# Patient Record
Sex: MALE | Race: WHITE
[De-identification: names, ages, dates, MRNs, and addresses within clinical notes are randomized per-mention and may not be internally consistent; named-entity substitution may affect disease eponyms.]

---

## 2017-09-24 ENCOUNTER — HOSPITAL ENCOUNTER (OUTPATIENT)
Dept: HOSPITAL 47 - EC | Age: 82
Setting detail: OBSERVATION
LOS: 1 days | Discharge: HOME | End: 2017-09-25
Payer: MEDICARE

## 2017-09-24 VITALS — BODY MASS INDEX: 26.1 KG/M2

## 2017-09-24 DIAGNOSIS — R55: Primary | ICD-10-CM

## 2017-09-24 DIAGNOSIS — E87.2: ICD-10-CM

## 2017-09-24 DIAGNOSIS — Z87.891: ICD-10-CM

## 2017-09-24 DIAGNOSIS — T67.5XXA: ICD-10-CM

## 2017-09-24 DIAGNOSIS — E78.5: ICD-10-CM

## 2017-09-24 DIAGNOSIS — M21.371: ICD-10-CM

## 2017-09-24 DIAGNOSIS — E86.0: ICD-10-CM

## 2017-09-24 DIAGNOSIS — I95.9: ICD-10-CM

## 2017-09-24 DIAGNOSIS — G56.03: ICD-10-CM

## 2017-09-24 DIAGNOSIS — Z79.899: ICD-10-CM

## 2017-09-24 LAB
ALP SERPL-CCNC: 64 U/L (ref 38–126)
ALT SERPL-CCNC: 39 U/L (ref 21–72)
ANION GAP SERPL CALC-SCNC: 11 MMOL/L
APTT BLD: 23.2 SEC (ref 22–30)
AST SERPL-CCNC: 23 U/L (ref 17–59)
BASOPHILS # BLD AUTO: 0.1 K/UL (ref 0–0.2)
BASOPHILS NFR BLD AUTO: 1 %
BUN SERPL-SCNC: 16 MG/DL (ref 9–20)
CALCIUM SPEC-MCNC: 8.8 MG/DL (ref 8.4–10.2)
CH: 33.1
CHCM: 33.9
CHLORIDE SERPL-SCNC: 107 MMOL/L (ref 98–107)
CK SERPL-CCNC: 49 U/L (ref 55–170)
CO2 SERPL-SCNC: 20 MMOL/L (ref 22–30)
EOSINOPHIL # BLD AUTO: 0.2 K/UL (ref 0–0.7)
EOSINOPHIL NFR BLD AUTO: 2 %
ERYTHROCYTE [DISTWIDTH] IN BLOOD BY AUTOMATED COUNT: 4.69 M/UL (ref 4.3–5.9)
ERYTHROCYTE [DISTWIDTH] IN BLOOD: 13.8 % (ref 11.5–15.5)
GLUCOSE SERPL-MCNC: 113 MG/DL (ref 74–99)
HCT VFR BLD AUTO: 46.2 % (ref 39–53)
HDW: 2.35
HGB BLD-MCNC: 15.3 GM/DL (ref 13–17.5)
INR PPP: 1 (ref ?–1.2)
LUC NFR BLD AUTO: 2 %
LYMPHOCYTES # SPEC AUTO: 2.2 K/UL (ref 1–4.8)
LYMPHOCYTES NFR SPEC AUTO: 21 %
MAGNESIUM SPEC-SCNC: 1.9 MG/DL (ref 1.6–2.3)
MCH RBC QN AUTO: 32.6 PG (ref 25–35)
MCHC RBC AUTO-ENTMCNC: 33.1 G/DL (ref 31–37)
MCV RBC AUTO: 98.3 FL (ref 80–100)
MONOCYTES # BLD AUTO: 0.8 K/UL (ref 0–1)
MONOCYTES NFR BLD AUTO: 8 %
NEUTROPHILS # BLD AUTO: 7.1 K/UL (ref 1.3–7.7)
NEUTROPHILS NFR BLD AUTO: 67 %
NON-AFRICAN AMERICAN GFR(MDRD): >60
PH UR: 6 [PH] (ref 5–8)
POTASSIUM SERPL-SCNC: 4.4 MMOL/L (ref 3.5–5.1)
PROT SERPL-MCNC: 6.4 G/DL (ref 6.3–8.2)
PT BLD: 10.1 SEC (ref 9–12)
SODIUM SERPL-SCNC: 138 MMOL/L (ref 137–145)
SP GR UR: 1.01 (ref 1–1.03)
TROPONIN I SERPL-MCNC: <0.012 NG/ML (ref 0–0.03)
UA BILLING (MACRO VS. MICRO): (no result)
UROBILINOGEN UR QL STRIP: <2 MG/DL (ref ?–2)
WBC # BLD AUTO: 0.2 10*3/UL
WBC # BLD AUTO: 10.5 K/UL (ref 3.8–10.6)
WBC (PEROX): 10.97

## 2017-09-24 PROCEDURE — 93005 ELECTROCARDIOGRAM TRACING: CPT

## 2017-09-24 PROCEDURE — 36415 COLL VENOUS BLD VENIPUNCTURE: CPT

## 2017-09-24 PROCEDURE — 83735 ASSAY OF MAGNESIUM: CPT

## 2017-09-24 PROCEDURE — 84443 ASSAY THYROID STIM HORMONE: CPT

## 2017-09-24 PROCEDURE — 82553 CREATINE MB FRACTION: CPT

## 2017-09-24 PROCEDURE — 70450 CT HEAD/BRAIN W/O DYE: CPT

## 2017-09-24 PROCEDURE — 83605 ASSAY OF LACTIC ACID: CPT

## 2017-09-24 PROCEDURE — 73110 X-RAY EXAM OF WRIST: CPT

## 2017-09-24 PROCEDURE — 85730 THROMBOPLASTIN TIME PARTIAL: CPT

## 2017-09-24 PROCEDURE — 99285 EMERGENCY DEPT VISIT HI MDM: CPT

## 2017-09-24 PROCEDURE — 87040 BLOOD CULTURE FOR BACTERIA: CPT

## 2017-09-24 PROCEDURE — 81003 URINALYSIS AUTO W/O SCOPE: CPT

## 2017-09-24 PROCEDURE — 83880 ASSAY OF NATRIURETIC PEPTIDE: CPT

## 2017-09-24 PROCEDURE — 71020: CPT

## 2017-09-24 PROCEDURE — 87086 URINE CULTURE/COLONY COUNT: CPT

## 2017-09-24 PROCEDURE — 96372 THER/PROPH/DIAG INJ SC/IM: CPT

## 2017-09-24 PROCEDURE — 96360 HYDRATION IV INFUSION INIT: CPT

## 2017-09-24 PROCEDURE — 96361 HYDRATE IV INFUSION ADD-ON: CPT

## 2017-09-24 PROCEDURE — 84484 ASSAY OF TROPONIN QUANT: CPT

## 2017-09-24 PROCEDURE — 82550 ASSAY OF CK (CPK): CPT

## 2017-09-24 PROCEDURE — 80053 COMPREHEN METABOLIC PANEL: CPT

## 2017-09-24 PROCEDURE — 85610 PROTHROMBIN TIME: CPT

## 2017-09-24 PROCEDURE — 85025 COMPLETE CBC W/AUTO DIFF WBC: CPT

## 2017-09-24 RX ADMIN — CEFAZOLIN SCH MLS/HR: 330 INJECTION, POWDER, FOR SOLUTION INTRAMUSCULAR; INTRAVENOUS at 16:13

## 2017-09-24 NOTE — XR
EXAMINATION TYPE: XR chest 2V

 

DATE OF EXAM: 9/24/2017

 

COMPARISON: NONE

 

HISTORY: Weakness

 

TECHNIQUE:  Frontal and lateral views of the chest are obtained.

 

FINDINGS:  There is no heart failure nor confluent pneumonic infiltrate. There are no hilar masses. T
horacic aorta shows mild atheromatous change. There is no sign of pleural effusion.

 

There is 30% anterior wedging of T12 and L2 vertebra.

 

IMPRESSION:  No active cardiac pulmonary disease. Normal heart. Old compression fractures.

## 2017-09-24 NOTE — XR
EXAMINATION TYPE: XR wrist complete RT

 

DATE OF EXAM: 9/24/2017

 

COMPARISON: NONE

 

HISTORY: Pain and swelling

 

TECHNIQUE: 4 views

 

FINDINGS: There is calcification in the tracheal cartilage. Carpal bones are intact and there is narr
owing of first carpometacarpal joint space. I see no fracture line. There is some narrowing of radioc
arpal joint space.

 

IMPRESSION: Hypertrophic degenerative changes. Chondrocalcinosis. No fracture seen.

## 2017-09-24 NOTE — CT
EXAMINATION TYPE: CT brain wo con

 

DATE OF EXAM: 9/24/2017

 

COMPARISON: NONE

 

HISTORY: Syncopal episode today

 

CT DLP: 1036 mGycm

Automated exposure control for dose reduction was used.

 

FINDINGS: 

There is mild cerebral cortical atrophy. There is no mass effect nor midline shift. There is no sign 
of intracranial hemorrhage. There is mild hypodensity in the periventricular white matter. The calvar
ium is intact. There is mucosal thickening throughout the left mastoid air cells. There is opacificat
ion of the left middle ear cavity.

 

IMPRESSION: 

CEREBRAL ATROPHY AND CHRONIC SMALL VESSEL ISCHEMIA. NO ACUTE INTRACRANIAL ABNORMALITY.

 

CHANGES OF CHRONIC LEFT MASTOIDITIS. THERE IS INCREASED DENSITY IN THE LEFT MIDDLE EAR CAVITY CONSIST
ENT WITH OTITIS MEDIA.

## 2017-09-24 NOTE — ED
General Adult HPI





- General


Chief complaint: Weakness


Stated complaint: Poss Stroke


Time Seen by Provider: 09/24/17 14:52


Source: patient, family, RN notes reviewed


Mode of arrival: wheelchair


Limitations: no limitations





- History of Present Illness


Initial comments: 





82-year-old male with past medical history of hypercholesterolemia presents for 

evaluation of generalized weakness and episode of confusion.  Patient was 

shopping with his wife, complaints of dizziness and lightheadedness.  Patient 

was able to get into the car with some assistance.  No focal weakness reported.

  No chest pain or palpitations.  No abdominal pain.  No fever or chills.  

Patient's had a momentary loss consciousness while in the car with his wife.  

He was unresponsive.  Patient has no history of stroke, no heart history, only 

medicine he takes is for cholesterol.





- Related Data


 Home Medications











 Medication  Instructions  Recorded  Confirmed


 


Calcium Carbonate [Calcium] 600 mg PO QAM 09/24/17 09/24/17


 


Cholecalciferol [Vitamin D3] 1,000 unit PO QAM 09/24/17 09/24/17


 


Magnesium 200 mg PO QAM 09/24/17 09/24/17


 


Meloxicam [Mobic] 7.5 mg PO DAILY PRN 09/24/17 09/24/17


 


Omeprazole 20 mg PO DAILY PRN 09/24/17 09/24/17


 


Pravastatin Sodium [Pravachol] 20 mg PO HS 09/24/17 09/24/17


 


Zinc 50 mg PO QA 09/24/17 09/24/17











 Allergies











Allergy/AdvReac Type Severity Reaction Status Date / Time


 


No Known Allergies Allergy   Verified 09/24/17 15:30














Review of Systems


ROS Statement: 


Those systems with pertinent positive or pertinent negative responses have been 

documented in the HPI.





ROS Other: All systems not noted in ROS Statement are negative.





Past Medical History


Past Medical History: Hyperlipidemia


History of Any Multi-Drug Resistant Organisms: None Reported


Additional Past Surgical History / Comment(s): hernia repairx2  rt knee 

replacement


Past Psychological History: No Psychological Hx Reported


Smoking Status: Never smoker


Past Alcohol Use History: None Reported


Past Drug Use History: None Reported





General Exam


Limitations: no limitations


General appearance: alert, in no apparent distress


Head exam: Present: atraumatic, normocephalic


Eye exam: Present: normal appearance, PERRL


ENT exam: Present: normal exam, mucous membranes moist


Neck exam: Present: normal inspection.  Absent: tenderness, meningismus


Respiratory exam: Present: normal lung sounds bilaterally.  Absent: respiratory 

distress


Cardiovascular Exam: Present: regular rate, normal rhythm


GI/Abdominal exam: Present: soft.  Absent: distended, tenderness


Extremities exam: Present: normal capillary refill, joint swelling (Right wrist 

tender and swollen).  Absent: calf tenderness





Course


 Vital Signs











  09/24/17 09/24/17 09/24/17





  14:50 15:05 16:15


 


Temperature 97.4 F L  


 


Pulse Rate  77 85


 


Respiratory 18 18 18





Rate   


 


Blood Pressure 87/57 99/59 140/86


 


Blood Pressure   





[Right Arm   





Sitting]   


 


Blood Pressure   





[Right Arm   





Standing]   


 


Blood Pressure   





[Right Arm   





Supine]   


 


O2 Sat by Pulse   97





Oximetry   














  09/24/17 09/24/17





  16:56 17:10


 


Temperature  


 


Pulse Rate 90 


 


Respiratory 20 





Rate  


 


Blood Pressure 165/98 


 


Blood Pressure  163/95





[Right Arm  





Sitting]  


 


Blood Pressure  157/93





[Right Arm  





Standing]  


 


Blood Pressure  156/88





[Right Arm  





Supine]  


 


O2 Sat by Pulse 97 





Oximetry  














- Reevaluation(s)


Reevaluation #1: 





09/24/17 17:25


Patient receives IV hydration, reevaluation he is completely asymptomatic.





EKG Findings





- EKG Comments:


EKG Findings:: EKG shows no sinus rhythm, ventricular rate 66, OR interval 118, 

QRS duration 74, , no signs of ischemia or infarction





Medical Decision Making





- Medical Decision Making





82-year-old gentleman with no significant past medical history presents with 

syncopal episode.  Patient does admit on reevaluation 2 doing extensive [place] 

work yesterday.  It was very warm all.  Denies nausea vomiting.  States he did 

have breakfast and some water today.  On initial evaluation patient was 

hypotensive.  He receives IV hydration.  Laboratory studies including CBC, CMP 

like gas and urinalysis are unremarkable for mild lactic acidosis at 2.5.  This 

is consistent with dehydration and volume depletion.  EKG is nonischemic.  

Chest x-ray shows no acute findings.  Head CT negative for intracranial 

pathology.  Reevaluation patient is feeling better, however his wife is very 

concerned if the second episode like this were to happen.  They are both 

agreeable to observation with IV rehydration.





- Lab Data


Result diagrams: 


 09/24/17 15:00





 09/24/17 15:00


 Lab Results











  09/24/17 09/24/17 09/24/17 Range/Units





  15:00 15:00 15:00 


 


WBC     (3.8-10.6)  k/uL


 


RBC     (4.30-5.90)  m/uL


 


Hgb     (13.0-17.5)  gm/dL


 


Hct     (39.0-53.0)  %


 


MCV     (80.0-100.0)  fL


 


MCH     (25.0-35.0)  pg


 


MCHC     (31.0-37.0)  g/dL


 


RDW     (11.5-15.5)  %


 


Plt Count     (150-450)  k/uL


 


Neutrophils %     %


 


Lymphocytes %     %


 


Monocytes %     %


 


Eosinophils %     %


 


Basophils %     %


 


Neutrophils #     (1.3-7.7)  k/uL


 


Lymphocytes #     (1.0-4.8)  k/uL


 


Monocytes #     (0-1.0)  k/uL


 


Eosinophils #     (0-0.7)  k/uL


 


Basophils #     (0-0.2)  k/uL


 


PT     (9.0-12.0)  sec


 


INR     (<1.2)  


 


APTT     (22.0-30.0)  sec


 


Sodium  138    (137-145)  mmol/L


 


Potassium  4.4    (3.5-5.1)  mmol/L


 


Chloride  107    ()  mmol/L


 


Carbon Dioxide  20 L    (22-30)  mmol/L


 


Anion Gap  11    mmol/L


 


BUN  16    (9-20)  mg/dL


 


Creatinine  0.90    (0.66-1.25)  mg/dL


 


Est GFR (MDRD) Af Amer  >60    (>60 ml/min/1.73 sqM)  


 


Est GFR (MDRD) Non-Af  >60    (>60 ml/min/1.73 sqM)  


 


Glucose  113 H    (74-99)  mg/dL


 


Plasma Lactic Acid Gerardo     (0.7-2.0)  mmol/L


 


Calcium  8.8    (8.4-10.2)  mg/dL


 


Magnesium  1.9    (1.6-2.3)  mg/dL


 


Total Bilirubin  0.8    (0.2-1.3)  mg/dL


 


AST  23    (17-59)  U/L


 


ALT  39    (21-72)  U/L


 


Alkaline Phosphatase  64    ()  U/L


 


Total Creatine Kinase    49 L  ()  U/L


 


CK-MB (CK-2)    1.3  (0.0-2.4)  ng/mL


 


CK-MB (CK-2) Rel Index    2.7  


 


Troponin I    <0.012  (0.000-0.034)  ng/mL


 


NT-Pro-B Natriuret Pep   42   pg/mL


 


Total Protein  6.4    (6.3-8.2)  g/dL


 


Albumin  3.8    (3.5-5.0)  g/dL


 


TSH  3.310    (0.465-4.680)  mIU/L


 


Urine Color     


 


Urine Appearance     (Clear)  


 


Urine pH     (5.0-8.0)  


 


Ur Specific Gravity     (1.001-1.035)  


 


Urine Protein     (Negative)  


 


Urine Glucose (UA)     (Negative)  


 


Urine Ketones     (Negative)  


 


Urine Blood     (Negative)  


 


Urine Nitrite     (Negative)  


 


Urine Bilirubin     (Negative)  


 


Urine Urobilinogen     (<2.0)  mg/dL


 


Ur Leukocyte Esterase     (Negative)  














  09/24/17 09/24/17 09/24/17 Range/Units





  15:00 15:00 16:20 


 


WBC  10.5    (3.8-10.6)  k/uL


 


RBC  4.69    (4.30-5.90)  m/uL


 


Hgb  15.3    (13.0-17.5)  gm/dL


 


Hct  46.2    (39.0-53.0)  %


 


MCV  98.3    (80.0-100.0)  fL


 


MCH  32.6    (25.0-35.0)  pg


 


MCHC  33.1    (31.0-37.0)  g/dL


 


RDW  13.8    (11.5-15.5)  %


 


Plt Count  224    (150-450)  k/uL


 


Neutrophils %  67    %


 


Lymphocytes %  21    %


 


Monocytes %  8    %


 


Eosinophils %  2    %


 


Basophils %  1    %


 


Neutrophils #  7.1    (1.3-7.7)  k/uL


 


Lymphocytes #  2.2    (1.0-4.8)  k/uL


 


Monocytes #  0.8    (0-1.0)  k/uL


 


Eosinophils #  0.2    (0-0.7)  k/uL


 


Basophils #  0.1    (0-0.2)  k/uL


 


PT   10.1   (9.0-12.0)  sec


 


INR   1.0   (<1.2)  


 


APTT   23.2   (22.0-30.0)  sec


 


Sodium     (137-145)  mmol/L


 


Potassium     (3.5-5.1)  mmol/L


 


Chloride     ()  mmol/L


 


Carbon Dioxide     (22-30)  mmol/L


 


Anion Gap     mmol/L


 


BUN     (9-20)  mg/dL


 


Creatinine     (0.66-1.25)  mg/dL


 


Est GFR (MDRD) Af Amer     (>60 ml/min/1.73 sqM)  


 


Est GFR (MDRD) Non-Af     (>60 ml/min/1.73 sqM)  


 


Glucose     (74-99)  mg/dL


 


Plasma Lactic Acid Gerardo    2.5 H*  (0.7-2.0)  mmol/L


 


Calcium     (8.4-10.2)  mg/dL


 


Magnesium     (1.6-2.3)  mg/dL


 


Total Bilirubin     (0.2-1.3)  mg/dL


 


AST     (17-59)  U/L


 


ALT     (21-72)  U/L


 


Alkaline Phosphatase     ()  U/L


 


Total Creatine Kinase     ()  U/L


 


CK-MB (CK-2)     (0.0-2.4)  ng/mL


 


CK-MB (CK-2) Rel Index     


 


Troponin I     (0.000-0.034)  ng/mL


 


NT-Pro-B Natriuret Pep     pg/mL


 


Total Protein     (6.3-8.2)  g/dL


 


Albumin     (3.5-5.0)  g/dL


 


TSH     (0.465-4.680)  mIU/L


 


Urine Color     


 


Urine Appearance     (Clear)  


 


Urine pH     (5.0-8.0)  


 


Ur Specific Gravity     (1.001-1.035)  


 


Urine Protein     (Negative)  


 


Urine Glucose (UA)     (Negative)  


 


Urine Ketones     (Negative)  


 


Urine Blood     (Negative)  


 


Urine Nitrite     (Negative)  


 


Urine Bilirubin     (Negative)  


 


Urine Urobilinogen     (<2.0)  mg/dL


 


Ur Leukocyte Esterase     (Negative)  














  09/24/17 Range/Units





  16:47 


 


WBC   (3.8-10.6)  k/uL


 


RBC   (4.30-5.90)  m/uL


 


Hgb   (13.0-17.5)  gm/dL


 


Hct   (39.0-53.0)  %


 


MCV   (80.0-100.0)  fL


 


MCH   (25.0-35.0)  pg


 


MCHC   (31.0-37.0)  g/dL


 


RDW   (11.5-15.5)  %


 


Plt Count   (150-450)  k/uL


 


Neutrophils %   %


 


Lymphocytes %   %


 


Monocytes %   %


 


Eosinophils %   %


 


Basophils %   %


 


Neutrophils #   (1.3-7.7)  k/uL


 


Lymphocytes #   (1.0-4.8)  k/uL


 


Monocytes #   (0-1.0)  k/uL


 


Eosinophils #   (0-0.7)  k/uL


 


Basophils #   (0-0.2)  k/uL


 


PT   (9.0-12.0)  sec


 


INR   (<1.2)  


 


APTT   (22.0-30.0)  sec


 


Sodium   (137-145)  mmol/L


 


Potassium   (3.5-5.1)  mmol/L


 


Chloride   ()  mmol/L


 


Carbon Dioxide   (22-30)  mmol/L


 


Anion Gap   mmol/L


 


BUN   (9-20)  mg/dL


 


Creatinine   (0.66-1.25)  mg/dL


 


Est GFR (MDRD) Af Amer   (>60 ml/min/1.73 sqM)  


 


Est GFR (MDRD) Non-Af   (>60 ml/min/1.73 sqM)  


 


Glucose   (74-99)  mg/dL


 


Plasma Lactic Acid Gerardo   (0.7-2.0)  mmol/L


 


Calcium   (8.4-10.2)  mg/dL


 


Magnesium   (1.6-2.3)  mg/dL


 


Total Bilirubin   (0.2-1.3)  mg/dL


 


AST   (17-59)  U/L


 


ALT   (21-72)  U/L


 


Alkaline Phosphatase   ()  U/L


 


Total Creatine Kinase   ()  U/L


 


CK-MB (CK-2)   (0.0-2.4)  ng/mL


 


CK-MB (CK-2) Rel Index   


 


Troponin I   (0.000-0.034)  ng/mL


 


NT-Pro-B Natriuret Pep   pg/mL


 


Total Protein   (6.3-8.2)  g/dL


 


Albumin   (3.5-5.0)  g/dL


 


TSH   (0.465-4.680)  mIU/L


 


Urine Color  Yellow  


 


Urine Appearance  Clear  (Clear)  


 


Urine pH  6.0  (5.0-8.0)  


 


Ur Specific Gravity  1.012  (1.001-1.035)  


 


Urine Protein  Trace H  (Negative)  


 


Urine Glucose (UA)  Negative  (Negative)  


 


Urine Ketones  Negative  (Negative)  


 


Urine Blood  Negative  (Negative)  


 


Urine Nitrite  Negative  (Negative)  


 


Urine Bilirubin  Negative  (Negative)  


 


Urine Urobilinogen  <2.0  (<2.0)  mg/dL


 


Ur Leukocyte Esterase  Negative  (Negative)  














Disposition


Clinical Impression: 


 Syncope, Dehydration





Disposition: ADMITTED AS IP TO THIS HOSP


Condition: Stable


Referrals: 


Thad Terrazas DO [Primary Care Provider] - 1-2 days


Decision to Admit Reason: Admit from EC


Decision Date: 09/24/17


Decision Time: 17:28

## 2017-09-24 NOTE — P.HPIM
History of Present Illness


H&P Date: 09/24/17


Chief Complaint: Dizzy and lightheaded


82-year-old male patient of Dr. Thad Terrazas with past medical history 

significant for hypercholesterolemia presented for evaluation of generalized 

weakness and an episode of lightheadedness and confusion.  Patient and his wife 

went to [employer] for shopping, patient's wife went inside the mall and 

completed her shopping while the patient sat in his car and read a book, 

patient went back into the mall to meet up with wife and on the way out patient 

became dizzy and lightheaded.  No chest pain and palpitations no abdominal pain 

no fever no chills.  Patient was assisted into the car by his wife and another 

.  Patient doesn't remember much after that, wife informs us that he was 

unresponsive in the car for a short time.  Patient states that he did do a fair 

amount of activity prior to going to the mall with his wife he cut the grass 

watered flowers and rode his bicycle.








Review of Systems


GEN.: [None]


EYES: [Previous cataract surgery bilaterally, wears glasses]


HEENT: [None]


NECK: [None]


RESPIRATORY: [None]


CARDIOVASCULAR: [None]


GASTROINTESTINAL: [Occasional indigestion]


GENITOURINARY: [Urgency]


MUSCULOSKELETAL: [Right foot drop secondary to old sports injury, and the type 

work patient has done, bilateral carpal tunnel wrist pain]


LYMPHATICS: [None]


HEMATOLOGICAL: [None]  


PSYCHIATRY: [None]


NEUROLOGICAL: [None]








Past Medical History


Past Medical History: Hyperlipidemia


History of Any Multi-Drug Resistant Organisms: None Reported


Additional Past Surgical History / Comment(s): hernia repairx2  rt knee 

replacement


Past Psychological History: No Psychological Hx Reported


Smoking Status: Former smoker (smoked a pipe or cigars until 1964, pipe was 

occasional and cigars were about 1-2/day for 25 years)


Past Alcohol Use History: None Reported


Past Drug Use History: None Reported


Additional History: Work History: , Currently retired





Medications and Allergies


 Home Medications











 Medication  Instructions  Recorded  Confirmed  Type


 


Calcium Carbonate [Calcium] 600 mg PO QAM 09/24/17 09/24/17 History


 


Cholecalciferol [Vitamin D3] 1,000 unit PO QAM 09/24/17 09/24/17 History


 


Magnesium 200 mg PO QAM 09/24/17 09/24/17 History


 


Meloxicam [Mobic] 7.5 mg PO DAILY PRN 09/24/17 09/24/17 History


 


Omeprazole 20 mg PO DAILY PRN 09/24/17 09/24/17 History


 


Pravastatin Sodium [Pravachol] 20 mg PO DAILY 09/24/17 09/24/17 History


 


Zinc 50 mg PO QAM 09/24/17 09/24/17 History











 Allergies











Allergy/AdvReac Type Severity Reaction Status Date / Time


 


No Known Allergies Allergy   Verified 09/24/17 15:30














Physical Exam


Vitals: 


 Vital Signs











  Temp Pulse Pulse Resp BP BP BP


 


 09/24/17 19:03    99  18   


 


 09/24/17 19:01  97.4 F L   99  18    171/96


 


 09/24/17 17:59  98 F  88   20  170/106  


 


 09/24/17 17:10       163/95  157/93


 


 09/24/17 16:56   90   20  165/98  


 


 09/24/17 16:15   85   18  140/86  


 


 09/24/17 15:05   77   18  99/59  


 


 09/24/17 14:50  97.4 F L    18  87/57  














  BP Pulse Ox


 


 09/24/17 19:03  


 


 09/24/17 19:01   97


 


 09/24/17 17:59   99


 


 09/24/17 17:10  156/88 


 


 09/24/17 16:56   97


 


 09/24/17 16:15   97


 


 09/24/17 15:05  


 


 09/24/17 14:50  








 Intake and Output











 09/24/17 09/24/17 09/24/17





 06:59 14:59 22:59


 


Other:   


 


  Voiding Method   Toilet


 


  Weight  73.482 kg 73.482 kg








 Patient Weight











 09/25/17





 06:59


 


Weight 73.482 kg














VITAL SIGNS: [Temperature 97.4, pulse 99, respirations 18, blood pressure 171/96

, oxygen saturation 97% on room air..  BMI 26.1 kg/m]


GENERAL: [Average built, sitting up, comfortable].


EYES: [Pupils equal.  Conjunctiva tara]l.


HEENT: [External appearance of nose and ears normal, oral cavity grossly normal]

.


NECK: [JVD not raised; masses not palpable].


HEART: [First and second heart sounds are normal;  no edema].  


LUNGS:[ Respiratory rate normal; clear to auscultation].  


ABDOMEN: [Soft,  nontender, liver spleen not palpable, no masses palpable].  


LYMPHATICS: [No lymph nodes palpable in the axilla and neck].  


PSYCH: [Alert and oriented x3;  mood  and affect tara]l. 


MUSCULOSKELETAL: Right foot with brace on for foot drop.  Bilateral wrists 

tender to palpation secondary to carpal tunnel 


NEUROLOGICAL: [Cranial nerves grossly intact; no facial asymmetry,   power and 

sensation grossly intact].  





Results


CBC & Chem 7: 


 09/24/17 15:00





 09/24/17 15:00


Labs: 


 Abnormal Lab Results - Last 24 Hours (Table)











  09/24/17 09/24/17 09/24/17 Range/Units





  15:00 15:00 16:20 


 


Carbon Dioxide  20 L    (22-30)  mmol/L


 


Glucose  113 H    (74-99)  mg/dL


 


Plasma Lactic Acid Gerardo    2.5 H*  (0.7-2.0)  mmol/L


 


Total Creatine Kinase   49 L   ()  U/L


 


Urine Protein     (Negative)  














  09/24/17 Range/Units





  16:47 


 


Carbon Dioxide   (22-30)  mmol/L


 


Glucose   (74-99)  mg/dL


 


Plasma Lactic Acid Gerardo   (0.7-2.0)  mmol/L


 


Total Creatine Kinase   ()  U/L


 


Urine Protein  Trace H  (Negative)  














Thrombosis Risk Factor Assmnt





- Choose All That Apply


Each Risk Factor Represents 3 Points: Age 75 years or older


Thrombosis Risk Factor Assessment Total Risk Factor Score: 3


Thrombosis Risk Factor Assessment Level: Moderate Risk





Assessment and Plan


Plan: 


ASSESSMENT:


-Dizziness and lightheadedness, likely due to dehydration or possibly low blood 

glucose.


-Syncope, in a patient with no significant cardiac history


-Lactic acidosis likely due to dehydration


-Hyperlipidemia


-Right foot drop secondary to old sports injury, brace in place


-bilateral wrist pain, likely due to known carpal tunnel.





PLAN:


Reorder home medication Continue IV fluids,  orthostatic vitals, monitor 

patient overnight.  We will continue to follow this patient closely.  Plan of 

care discussed with the patient the bedside he is in agreement.

## 2017-09-25 VITALS
SYSTOLIC BLOOD PRESSURE: 152 MMHG | RESPIRATION RATE: 20 BRPM | TEMPERATURE: 98.1 F | DIASTOLIC BLOOD PRESSURE: 93 MMHG | HEART RATE: 78 BPM

## 2017-09-25 LAB
ALP SERPL-CCNC: 55 U/L (ref 38–126)
ALT SERPL-CCNC: 23 U/L (ref 21–72)
ANION GAP SERPL CALC-SCNC: 9 MMOL/L
AST SERPL-CCNC: 18 U/L (ref 17–59)
BASOPHILS # BLD AUTO: 0 K/UL (ref 0–0.2)
BASOPHILS NFR BLD AUTO: 0 %
BUN SERPL-SCNC: 11 MG/DL (ref 9–20)
CALCIUM SPEC-MCNC: 8.4 MG/DL (ref 8.4–10.2)
CH: 32.8
CHCM: 33.4
CHLORIDE SERPL-SCNC: 111 MMOL/L (ref 98–107)
CO2 SERPL-SCNC: 21 MMOL/L (ref 22–30)
EOSINOPHIL # BLD AUTO: 0.1 K/UL (ref 0–0.7)
EOSINOPHIL NFR BLD AUTO: 1 %
ERYTHROCYTE [DISTWIDTH] IN BLOOD BY AUTOMATED COUNT: 4.47 M/UL (ref 4.3–5.9)
ERYTHROCYTE [DISTWIDTH] IN BLOOD: 14 % (ref 11.5–15.5)
GLUCOSE BLD-MCNC: 120 MG/DL (ref 75–99)
GLUCOSE SERPL-MCNC: 103 MG/DL (ref 74–99)
HCT VFR BLD AUTO: 44.2 % (ref 39–53)
HDW: 2.37
HGB BLD-MCNC: 14.5 GM/DL (ref 13–17.5)
LUC NFR BLD AUTO: 2 %
LYMPHOCYTES # SPEC AUTO: 1.5 K/UL (ref 1–4.8)
LYMPHOCYTES NFR SPEC AUTO: 19 %
MCH RBC QN AUTO: 32.3 PG (ref 25–35)
MCHC RBC AUTO-ENTMCNC: 32.7 G/DL (ref 31–37)
MCV RBC AUTO: 98.9 FL (ref 80–100)
MONOCYTES # BLD AUTO: 0.6 K/UL (ref 0–1)
MONOCYTES NFR BLD AUTO: 8 %
NEUTROPHILS # BLD AUTO: 5.5 K/UL (ref 1.3–7.7)
NEUTROPHILS NFR BLD AUTO: 70 %
NON-AFRICAN AMERICAN GFR(MDRD): >60
POTASSIUM SERPL-SCNC: 4.1 MMOL/L (ref 3.5–5.1)
PROT SERPL-MCNC: 6 G/DL (ref 6.3–8.2)
SODIUM SERPL-SCNC: 141 MMOL/L (ref 137–145)
WBC # BLD AUTO: 0.15 10*3/UL
WBC # BLD AUTO: 7.9 K/UL (ref 3.8–10.6)
WBC (PEROX): 7.79

## 2017-09-25 RX ADMIN — CEFAZOLIN SCH MLS/HR: 330 INJECTION, POWDER, FOR SOLUTION INTRAMUSCULAR; INTRAVENOUS at 05:51

## 2017-09-25 RX ADMIN — CEFAZOLIN SCH MLS/HR: 330 INJECTION, POWDER, FOR SOLUTION INTRAMUSCULAR; INTRAVENOUS at 09:52

## 2017-09-25 NOTE — DS
DISCHARGE SUMMARY



DATE OF ADMISSION:

09/24/2017



DATE OF DISCHARGE:

09/25/2017



FINAL DIAGNOSES:

1. Acute syncope due to dehydration due to heat exhaustion.

2. Lactic acid due to dehydration.

3. Hyperlipidemia.

4. 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

5. Bilateral carpal tunnel.



HOSPITAL COURSE:

This patient was out yesterday for quite a bit, rode his bicycle, walked; the

temperature was up to 90 degrees. He did not feel well afterwards and then passed out.

Felt to be dehydration. He was given fluids and did really well. EKG was unremarkable.

Patient was hypotensive initially, then his blood pressure did come up.



PHYSICAL EXAMINATION:

Lungs are clear.

CARDIOVASCULAR: First and second sounds normal.



DISCHARGE MEDICATIONS:

1. Calcium 600 mg p.o. daily.

2. Vitamin D3 1000 units p.o. daily.

3. Magnesium 200 mg p.o. daily.

4. Mobic 7.5 p.o. daily p.r.n.

5. Omeprazole 20 mg daily p.r.n.

6. Pravachol 20 mg p.o. daily.

7. Zinc 50 mg p.o. daily.

Follow up with Dr. Terrazas in 2 days.





MMODL / IJN: [id number] / Job#: [id number]

## 2018-05-21 ENCOUNTER — HOSPITAL ENCOUNTER (INPATIENT)
Dept: HOSPITAL 47 - EC | Age: 83
LOS: 4 days | Discharge: HOME | DRG: 69 | End: 2018-05-25
Payer: MEDICARE

## 2018-05-21 VITALS — BODY MASS INDEX: 25.2 KG/M2

## 2018-05-21 DIAGNOSIS — S63.91XA: ICD-10-CM

## 2018-05-21 DIAGNOSIS — E78.5: ICD-10-CM

## 2018-05-21 DIAGNOSIS — Z79.899: ICD-10-CM

## 2018-05-21 DIAGNOSIS — Z87.891: ICD-10-CM

## 2018-05-21 DIAGNOSIS — Z96.651: ICD-10-CM

## 2018-05-21 DIAGNOSIS — M21.371: ICD-10-CM

## 2018-05-21 DIAGNOSIS — Z79.82: ICD-10-CM

## 2018-05-21 DIAGNOSIS — M13.839: ICD-10-CM

## 2018-05-21 DIAGNOSIS — I08.3: ICD-10-CM

## 2018-05-21 DIAGNOSIS — E78.00: ICD-10-CM

## 2018-05-21 DIAGNOSIS — R47.01: ICD-10-CM

## 2018-05-21 DIAGNOSIS — G45.9: Primary | ICD-10-CM

## 2018-05-21 PROCEDURE — 93320 DOPPLER ECHO COMPLETE: CPT

## 2018-05-21 PROCEDURE — 93005 ELECTROCARDIOGRAM TRACING: CPT

## 2018-05-21 PROCEDURE — 93306 TTE W/DOPPLER COMPLETE: CPT

## 2018-05-21 PROCEDURE — 80048 BASIC METABOLIC PNL TOTAL CA: CPT

## 2018-05-21 PROCEDURE — 93325 DOPPLER ECHO COLOR FLOW MAPG: CPT

## 2018-05-21 PROCEDURE — 70553 MRI BRAIN STEM W/O & W/DYE: CPT

## 2018-05-21 PROCEDURE — 70549 MR ANGIOGRAPH NECK W/O&W/DYE: CPT

## 2018-05-21 PROCEDURE — 99285 EMERGENCY DEPT VISIT HI MDM: CPT

## 2018-05-21 PROCEDURE — 80061 LIPID PANEL: CPT

## 2018-05-21 PROCEDURE — 70544 MR ANGIOGRAPHY HEAD W/O DYE: CPT

## 2018-05-21 PROCEDURE — 96372 THER/PROPH/DIAG INJ SC/IM: CPT

## 2018-05-21 PROCEDURE — 93312 ECHO TRANSESOPHAGEAL: CPT

## 2018-05-21 NOTE — ED
Neuro HPI





- General


Chief Complaint: Neuro Symptoms/Deficit


Stated Complaint: poss TIA


Time Seen by Provider: 05/21/18 19:14


Source: patient, EMS


Mode of arrival: EMS


Limitations: no limitations





- History of Present Illness


Is the patient presenting with stroke symptoms?: Yes


Initial Comments: 





This 83-year-old white male presents by EMS as a transfer from [facility].  He apparently had problems with his speech earlier today.  It sounds 

as though he had some expressive aphasia around noon today which lasted about 

an hour or so.  The symptoms then resolved and he feels back to normal at this 

time.  He denies any history of CVA or TIA previously.  He denies any weakness 

of his extremities.  He does complain of some pain and swelling to his right 

wrist which she attributes to increased work recently.  He denies any actual 

trauma to his right wrist but does have significant swelling.  He denies any 

chest pain or shortness of breath.  He did have full workup at the other 

hospital including a CTA of the head and neck does not show any acute 

processes.  His laboratory is essentially within normal limits.  He denies any 

other complaints or modifying factors.





- Related Data


Home Medications: 


 Home Medications











 Medication  Instructions  Recorded  Confirmed


 


Calcium Carbonate [Calcium] 600 mg PO QAM 09/24/17 09/24/17


 


Cholecalciferol [Vitamin D3] 1,000 unit PO QAM 09/24/17 09/24/17


 


Magnesium 200 mg PO QAM 09/24/17 09/24/17


 


Meloxicam [Mobic] 7.5 mg PO DAILY PRN 09/24/17 09/24/17


 


Omeprazole 20 mg PO DAILY PRN 09/24/17 09/24/17


 


Pravastatin Sodium [Pravachol] 20 mg PO DAILY 09/24/17 09/24/17


 


Zinc 50 mg PO QAM 09/24/17 09/24/17











Allergies/Adverse Reactions: 


 Allergies











Allergy/AdvReac Type Severity Reaction Status Date / Time


 


No Known Allergies Allergy   Verified 09/24/17 15:30














Review of Systems


ROS Statement: 


Those systems with pertinent positive or pertinent negative responses have been 

documented in the HPI.





ROS Other: All systems not noted in ROS Statement are negative.





General Exam





- General Exam Comments


Initial Comments: 





GENERAL: The patient is well nourished and well hydrated. 


VITAL SIGNS: Heart rate, blood pressure, respiratory rate reviewed as recorded 

in nurse's notes. 


EYES: Pupils are round and reactive. Extraocular movements are intact. No 

conjunctival / lid redness or swelling. 


ENT: No external evidence of injury, swelling, or ecchymosis. Airway is patent. 

Throat is clear. 


NECK: Nontender. No swelling or evidence of injury. No subcutaneous emphysema. 

Trachea is midline. No thyroid mass. 


HEART: Regular rate and rhythm. Good peripheral pulses. 


LUNGS/CHEST: Breath sounds clear and equal bilaterally. No rales, rhonchi, or 

wheezes. No ecchymosis, subcutaneous emphysema, or tenderness. 


ABDOMEN: Abdomen soft without tenderness. No palpable masses or organomegaly. 

No peritoneal signs. No abdominal wall swelling or ecchymosis. 


EXTREMITIES: There is tenderness and swelling of the right wrist and hand.  

There is pain with any attempt of range of motion of the right wrist.  Normal 

muscle tone and function. No thoracolumbar tenderness. 


NEUROLOGIC: Sensation is grossly intact. Cranial nerve exam reveals face is 

symmetrical, tongue is midline, speech is clear. 


SKIN: No abrasions or ecchymosis is noted. No induration or masses noted. 


PSYCHIATRIC: Alert and oriented. Appropriate behavior and judgment.





Limitations: no limitations





Stroke MDM





- Medical Decision Making





The patient was seen and examined.  All diagnostics were reviewed from the 

sending facility.  He did have a EKG done which shows a normal sinus rhythm 

with no acute ST-T wave changes noted.  The patient also had a full laboratory 

workup which was all essentially within normal limits with minimal elevation of 

the lactic acid at 2.8.  The urine drug screen and alcohol level were negative 

as well.  The patient also had an x-ray of the right wrist which showed 

significant degenerative changes.  The patient also had an x-ray of the pelvis 

with did not show any acute abnormalities.  It sounds as though the patient did 

have signs of a TIA.  Is felt as though he would benefit from admission to the 

hospital short stay for further workup and treatment.  He is agreeable.  Case 

will be discussed with internal medicine shortly.





Past Medical History


Past Medical History: Hyperlipidemia


History of Any Multi-Drug Resistant Organisms: None Reported


Additional Past Surgical History / Comment(s): hernia repairx2  rt knee 

replacement


Past Psychological History: No Psychological Hx Reported


Smoking Status: Former smoker


Past Alcohol Use History: Occasional


Past Drug Use History: None Reported





Course





 Vital Signs











  05/21/18





  19:20


 


Temperature 97.5 F L


 


Pulse Rate 95


 


Respiratory 18





Rate 


 


Blood Pressure 169/98


 


O2 Sat by Pulse 97





Oximetry 














Disposition


Clinical Impression: 


 Expressive aphasia, Hypertension, Right wrist sprain, Wrist arthritis





Disposition: ADMITTED AS IP TO THIS HOSP


Condition: Fair


Is patient prescribed a controlled substance at d/c from ED?: No


Referrals: 


Thad Terrazas DO [Primary Care Provider] - 1-2 days


Time of Disposition: 19:38


Decision Date: 05/21/18


Decision Time: 19:39

## 2018-05-22 LAB
CHOLEST SERPL-MCNC: 149 MG/DL (ref ?–200)
HDLC SERPL-MCNC: 53 MG/DL (ref 40–60)
LDLC SERPL CALC-MCNC: 79 MG/DL (ref 0–99)
TRIGL SERPL-MCNC: 85 MG/DL (ref ?–150)

## 2018-05-22 RX ADMIN — ENOXAPARIN SODIUM SCH MG: 40 INJECTION SUBCUTANEOUS at 08:32

## 2018-05-22 RX ADMIN — PRAVASTATIN SODIUM SCH MG: 20 TABLET ORAL at 08:32

## 2018-05-22 RX ADMIN — CYANOCOBALAMIN TAB 500 MCG SCH MCG: 500 TAB at 08:32

## 2018-05-22 RX ADMIN — LORATADINE SCH MG: 10 TABLET ORAL at 08:32

## 2018-05-22 RX ADMIN — ASPIRIN 325 MG ORAL TABLET SCH MG: 325 PILL ORAL at 08:32

## 2018-05-22 NOTE — ECHOF
Referral Reason:Thrombus



MEASUREMENTS

--------

HEIGHT: 152.4 cm

WEIGHT: 79.4 kg

BP: 

RVIDd:   2.5 cm     (< 3.3)

IVSd:   1.4 cm     (0.6 - 1.1)

LVIDd:   4.1 cm     (3.9 - 5.3)

LVPWd:   1.3 cm     (0.6 - 1.1)

IVSs:   1.8 cm

LVIDs:   3.3 cm

LVPWs:   1.5 cm

LA Diam:   4.0 cm     (2.7 - 3.8)

LAESV Index (A-L):   45.08 ml/m

Ao Diam:   3.4 cm     (2.0 - 3.7)

AV Cusp:   1.5 cm     (1.5 - 2.6)

LA Diam:   2.9 cm     (2.7 - 3.8)

MV EXCURSION:   13.536 mm     (> 18.000)

MV EF SLOPE:   83 mm/s     (70 - 150)

EPSS:   0.5 cm

MV E Gerardo:   0.69 m/s

MV DecT:   226 ms

MV A Gerardo:   0.75 m/s

MV E/A Ratio:   0.93 

RAP:   5.00 mmHg

RVSP:   34.82 mmHg







FINDINGS

--------

Sinus rhythm.

This was a technically adequate study.

The left ventricular size is normal.   There is moderate concentric left ventricular hypertrophy.   O
verall left ventricular systolic function is low-normal with, an EF between 50 - 55 %.

The right ventricle is normal in size.

The left atrium is mildly dilated.   LA is severely dilated >40 ml/m2

The right atrial size is normal.

There is mild aortic valve sclerosis.   There is no evidence of aortic regurgitation.

Mild mitral annular calcification present.   Mild mitral regurgitation is present.

Mild tricuspid regurgitation present.   There is no evidence of pulmonary hypertension.   The right v
entricular systolic pressure, as measured by Doppler, is 34.82mmHg.

There is no pulmonic regurgitation present.

The aortic root size is normal.

There is no pericardial effusion.



CONCLUSIONS

--------

1. The left ventricular size is normal.

2. There is moderate concentric left ventricular hypertrophy.

3. Overall left ventricular systolic function is low-normal with, an EF between 50 - 55 %.

4. The right ventricle is normal in size.

5. The left atrium is mildly dilated.

6. LA is severely dilated >40 ml/m2

7. The right atrial size is normal.

8. There is mild aortic valve sclerosis.

9. Mild mitral annular calcification present.

10. Mild mitral regurgitation is present.

11. Mild tricuspid regurgitation present.

12. There is no evidence of pulmonary hypertension.

13. The right ventricular systolic pressure, as measured by Doppler, is 34.82mmHg.

14. There is no pulmonic regurgitation present.

15. The aortic root size is normal.

16. There is no pericardial effusion.





SONOGRAPHER: Meme Pat RDCS

## 2018-05-22 NOTE — P.CNNES
History of Present Illness


Consult date: 05/22/18


History of Present Illness: 





The patient is an 83-year-old right-handed white male who states that one week 

ago he was sitting in a recliner and when he got up out of the recliner he felt 

as if to ceiling was moving and he experienced a vertiginous sensation.  He saw 

his primary care physician the following day and some x-rays were done and a 

prescription for medication given.  That night the episode occurred again and 

he took one of the medications next day he felt better.  Patient reports that 2 

days ago he went to [employer] and he developed vertigo again while sitting in the 

store.  He went to the car and drove 1 mile and he couldn't get his words out 

properly.  He went to [facility] emergency room and was 

transferred here to [facility] because there was no bed there.

  He has had one episode of slurred speech in the emergency room here.  He 

feels well currently.  He is normally a very active person and he did ride his 

bike about 10 miles on Sunday morning since he was feeling fine.  He also has 

has some right hand swelling which she attributes to doing too much work last 

week.  At [facility] ER he had a CAT scan of the brain which was reported 

unremarkable.  He has a history of right foot drop patient denies any focal 

weakness or numbness.  He denies any double vision.  He denies any swallowing 

difficulty.  He was admitted to the hospital with TIA.  He apparently had a CTA 

done of the head and neck which did not show any acute process.  This was 

apparently done at [facility].





Review of Systems


Constitutional: Denies chills, Denies fever


Eyes: denies blurred vision, denies pain


Cardiovascular: Denies chest pain, Denies shortness of breath


Respiratory: Denies cough


Gastrointestinal: Denies abdominal pain, Denies diarrhea, Denies nausea, Denies 

vomiting


Musculoskeletal: Denies myalgias


Neurological: Reports as per HPI


Psychiatric: Denies anxiety, Denies depression





Past Medical History


Past Medical History: CVA/TIA, Hyperlipidemia


History of Any Multi-Drug Resistant Organisms: None Reported


Additional Past Surgical History / Comment(s): hernia repairx2  rt knee 

replacement


Past Psychological History: No Psychological Hx Reported


Smoking Status: Former smoker


Past Alcohol Use History: Occasional


Past Drug Use History: None Reported





Medications and Allergies


 Home Medications











 Medication  Instructions  Recorded  Confirmed  Type


 


Meloxicam [Mobic] 7.5 mg PO DAILY PRN 09/24/17 05/21/18 History


 


Omeprazole 20 mg PO DAILY PRN 09/24/17 05/21/18 History


 


Pravastatin Sodium [Pravachol] 20 mg PO DAILY 09/24/17 05/21/18 History


 


Aspirin [Adult Low Dose Aspirin EC] 81 mg PO DAILY 05/21/18 05/21/18 History


 


Calcium/Magnesium/Zinc 1 tab PO DAILY 05/21/18 05/21/18 History





[Calcium-Magnesium-Zinc Tablet]    


 


Cetirizine HCl [Zyrtec] 10 mg PO DAILY 05/21/18 05/21/18 History


 


Cyanocobalamin [Vitamin B-12] 500 mcg PO DAILY 05/21/18 05/21/18 History











 Allergies











Allergy/AdvReac Type Severity Reaction Status Date / Time


 


No Known Allergies Allergy   Verified 05/21/18 19:58














Physical Examination





- Vital Signs


Vital Signs: 


 Vital Signs











  Temp Pulse Pulse Resp BP BP Pulse Ox


 


 05/22/18 15:15  96.7 F L  83   18  138/76   94 L


 


 05/22/18 11:15  97.0 F L  81   18  178/103   97


 


 05/22/18 08:00  97.4 F L  74   20  160/100   96


 


 05/22/18 03:17   81   16  149/89   98


 


 05/22/18 00:00   84   16  178/98   96


 


 05/21/18 21:40  98.8 F   83  18   140/85  96








 Intake and Output











 05/22/18 05/22/18 05/22/18





 06:59 14:59 22:59


 


Intake Total  358 240


 


Output Total  500 0


 


Balance  -142 240


 


Intake:   


 


  Oral  358 240


 


Output:   


 


  Gastric Drainage   0


 


  Urine  500 0


 


  Stool   0


 


  Urine/Stool Mix   0


 


  Emesis   0


 


Other:   


 


  # Voids 1  0


 


  # Bowel Movements   0














- Constitutional


General appearance: average body habitus, cooperative





- EENT


EENT: PERRL, hearing intact, vision intact





- Respiratory


Respiratory: chest non-tender, lungs clear





- Cardiovascular


Cardiovascular: regular rate, normal S1, normal S2





- Integumentary


Integumentary: normal





- Neurologic


Cranial nerve examination: PERRL, EOMI, VFF, V1/V2/V3 grossly intact, face 

symmetric, tongue midline


Speech examination: intact


Sensorimotor examination: intact


Detailed motor examination: grossly full strength in all extremities


Detailed sensory examination: intact


Reflexes: 2+: bicep





- Musculoskeletal


Musculoskeletal: normal range of motion





- Psychiatric


Psychiatric: mood/affect appropriate





Assessment and Plan


(1) TIA (transient ischemic attack)


Current Visit: Yes   Status: Acute   SNOMED Code(s): 083365313


   


Plan: 





The patient is an 83-year-old man with history of recurrent episodes of aphasia 

which was brief.  This was associated with some vertigo.  The patient has 

likely had TIA.  He has been taking one baby aspirin daily.  Recommend further 

evaluation with MRA of the neck and head.  Also recommend MRI scan of the 

brain.  Recommend starting Plavix 75 mg a day if the patient is able to come 

off of NSAID Mobic.  However if the patient requires continuation of mobic then 

we will not add Plavix and instead increase aspirin to 325 mg once a day

## 2018-05-22 NOTE — HP
HISTORY AND PHYSICAL



DATE OF ADMISSION:

05/21/2018.



DATE OF SERVICE:

05/22/2018.



PRESENTING COMPLAINT:

Dizziness, slurred speech.



HISTORY OF PRESENTING COMPLAINT:

This is a pleasant 83-year-old patient of Dr. Terrazas who about a week ago had got up

from his easy chair and when he went to get to the bed, he felt the ceiling was

spinning and became wobbly while walking going to the bathroom.  He did go and see the

nurse practitioner at his family doctor's and he was given some medication.  Not sure.

Symptoms got better.  Then the patient had gone shopping 2 days after that and was

coming home and suddenly felt that his speech, he is not able to speak out words

properly and he still had a little trouble when he would walk wobbly to one side.

Those symptoms resolved.  The patient sometimes notices these symptoms of being

unsteady when he gets up. Today he is somewhat better, but he decided to come in to get

checked out.  The patient has some injury to the right hand from working in the yard

and has an Ace wrap and also support of the right foot for possible foot drop.  There

was no change in vision.  No headache.  No focal weakness otherwise.  The patient feels

his speech is back to normal.



REVIEW OF SYSTEMS:

None.  Constitutional none. HEENT none.  Respiratory none.  Cardiovascular none.

Gastrointestinal/genitourinary none.  Musculoskeletal none.  Dermatological and

hematologic, lymphatic none.  Psychiatry none.  Neurological as above.



PAST MEDICAL HISTORY:

Hyperlipidemia.



SURGICAL HISTORY:

Hernia repair x2, right knee replacement.



SOCIAL HISTORY:

Patient is a smoker in the remote past.  Alcohol occasionally.  .  Retired.



FAMILY HISTORY:

Reviewed, noncontributory to presentation.



HOME MEDICATIONS:

1. Omeprazole 20 mg p.o. daily p.r.n.

2. Mobic 7.5 p.o. daily p.r.n.

3. Calcium magnesium zinc 10 1 tab p.o. daily.

4. Pravachol 20 mg p.o. daily.

5. Vitamin B12 500 mcg p.o. daily.

6. Zyrtec 10 mg p.o. daily.

7. Aspirin 81 mg p.o. daily.



ALLERGIES:

None.



PHYSICAL EXAMINATION:

VITAL SIGNS:  On presentation, temperature 97.5.  Pulse 95. Respirations 18, blood

pressure 169/98, pulse ox 97% on room air.

General appearance:  Average build, lying in bed, comfortable.  Eyes: Pupils equal.

Conjunctivae normal.

HEENT: External appearance of nose and ears normal.  Oral cavity normal.

Neck:  JVD not raised.  Mass not palpable.  Respiratory effort normal.

Lungs are clear.

Cardiovascular:  1st and second sounds normal.  No edema.

ABDOMEN:  Soft, nontender.  Liver and spleen not palpable.  Lymphatics:  No lymph nodes

palpable in the neck or axilla.

PSYCHIATRY:  Alert and oriented x3.  Mood and affect normal. Neurological:  Pupils

equal.  Cranial nerves grossly intact.  Power and sensation grossly intact.  No

nystagmus.  There is possibly dysdiadochokinesia on the left side.  The patient's right

hand is in an Ace wrap and cannot be tested.  The patient's right knee is artificial,

otherwise power and sensation grossly intact.  The patient has got a support in the

right ankle.



INVESTIGATIONS:

LDL 79.  2D echo done here shows a preserved LV function and some left ventricular

hypertrophy.  The patient did have a CT of the head and neck at [facility]

that was negative.



ASSESSMENT:

1. This patient has slurring of speech, unsteadiness of his gait and some

    dysdiadochokinesia.  I suspect patient may have had a cerebellar/posterior

    circulation transient ischemic attack/stroke.  Needs to be further investigated.

2. Right hand sprain currently in an Ace wrap.

3. Questionable right footdrop, has a support in place.

4. Hypercholesteremia.  Patient takes Pravachol at home.



PLAN:

Patient is currently on aspirin.  Home medications are resumed.  Neurology consultation

has been done.  Neuro checks in place.  We will order a MRI with and without contrast

of the brain, especially looking at the posterior circulation and also do an MRA to

look in the neck to make sure not any stenosis.  Care was discussed with the patient.

Neurology was consulted.  Copy to Dr. Terrazas.



MMODL / BIANCAN: [id number] / Job#: [id number]

## 2018-05-23 LAB
ANION GAP SERPL CALC-SCNC: 14 MMOL/L
BUN SERPL-SCNC: 13 MG/DL (ref 9–20)
CALCIUM SPEC-MCNC: 9 MG/DL (ref 8.4–10.2)
CHLORIDE SERPL-SCNC: 103 MMOL/L (ref 98–107)
CHOLEST SERPL-MCNC: 153 MG/DL (ref ?–200)
CO2 SERPL-SCNC: 22 MMOL/L (ref 22–30)
GLUCOSE SERPL-MCNC: 92 MG/DL (ref 74–99)
HDLC SERPL-MCNC: 58 MG/DL (ref 40–60)
LDLC SERPL CALC-MCNC: 79 MG/DL (ref 0–99)
POTASSIUM SERPL-SCNC: 4.2 MMOL/L (ref 3.5–5.1)
SODIUM SERPL-SCNC: 139 MMOL/L (ref 137–145)
TRIGL SERPL-MCNC: 81 MG/DL (ref ?–150)

## 2018-05-23 RX ADMIN — LORATADINE SCH MG: 10 TABLET ORAL at 08:56

## 2018-05-23 RX ADMIN — ASPIRIN 325 MG ORAL TABLET SCH MG: 325 PILL ORAL at 08:56

## 2018-05-23 RX ADMIN — PRAVASTATIN SODIUM SCH MG: 20 TABLET ORAL at 08:56

## 2018-05-23 RX ADMIN — CYANOCOBALAMIN TAB 500 MCG SCH MCG: 500 TAB at 11:54

## 2018-05-23 RX ADMIN — PANTOPRAZOLE SODIUM SCH MG: 40 TABLET, DELAYED RELEASE ORAL at 08:56

## 2018-05-23 RX ADMIN — ENOXAPARIN SODIUM SCH MG: 40 INJECTION SUBCUTANEOUS at 08:56

## 2018-05-23 NOTE — PN
PROGRESS NOTE



DATE OF SERVICE:

05/23/2018



PRESENTING COMPLAINT:

Dizziness, slurred speech.



INTERVAL HISTORY:

This patient presented with some dizziness, slurred speech.  Symptoms have resolved.

Patient did have an MRI and MRA that were unremarkable. Otherwise feeling well.



REVIEW OF SYSTEMS:

Done for constitutional, cardiovascular, GI, pulmonary; relevant findings as above.



CURRENT MEDICATIONS:

Reviewed. They include aspirin and Pravachol.



PHYSICAL EXAMINATION:

Temperature 97.3, pulse 85, respiration 16, blood pressure 159/84, pulse ox 95% on room

air.

GENERAL APPEARANCE:  Lying in bed, comfortable.

EYES:  Pupils equal. Conjunctivae normal.

HEENT: External appearance of nose and ears normal. Oral cavity normal.

NECK: JVD not raised.  Mass not palpable.

RESPIRATORY: Effort normal. Lungs are clear.

CARDIOVASCULAR: First and second sounds normal. No edema.

ABDOMEN: Soft, non-tender. Liver and spleen not palpable.

PSYCHIATRY: Alert and oriented x3. Mood and affect normal.

NEUROLOGICAL: Unremarkable.



INVESTIGATIONS:

Potassium 4.2. BUN and creatinine are normal.



ASSESSMENT:

1. Episode of slurred speech and unsteady gait.  Currently MRI and MRA have been

    negative.

2. Right hand sprain, currently in an Ace wrap.

3. Chronic right foot drop.  The patient has a support in place.

4. Hypercholesteremia.



PLAN:

Will switch the patient's aspirin to 81 mg twice a day.  Will also add Lipitor.  Dr. Mitchell would like to proceed with a MIRA; hence Cardiology will be consulted.





MMODL / IJN: [id number] / Job#: [id number]

## 2018-05-23 NOTE — MR
EXAMINATION TYPE: MR angio head wo con

 

DATE OF EXAM: 5/23/2018

 

COMPARISON: NONE

 

HISTORY: speech slow/unsteady gait

 

TECHNIQUE: 

Time of flight images focusing on the [place] were performed without contrast.  2-D and 3-D 
postprocessing imaging is performed.

 

FINDINGS: There is no evidence of intracranial aneurysm. No focal stenosis, occlusion or dissection i
s seen. Vertebral arteries are codominant. [place] appears intact. No evidence of arterial v
enous malformation. Visualized portions of the brain are discussed on the MR brain dictation of the s
roxanna date.

 

IMPRESSION: No evidence of vascular occlusion, dissection, focal stenosis or intracranial aneurysm.

## 2018-05-23 NOTE — MR
EXAMINATION TYPE: MR brain wo/w mrane wo/wcon

 

DATE OF EXAM: 5/23/2018

 

COMPARISON: CT brain dated 9/24/2017

 

HISTORY: speech slow/unsteady gait

 

TECHNIQUE: 

Multiplanar, multisequence images of the brain and brainstem is performed without and with IV contras
t, utilizing 8 mL intravenous Gadavist . MRA of the neck was also performed with and without contrast
.

 

FINDINGS: Diffusion weighted images demonstrate no evidence of a recent infarct or other diffusion ab
normality.  There is no extra-axial fluid collection.  The ventricular system and cisternal spaces ar
e symmetrically prominent compatible with age-related volume loss. Confluent nonenhancing periventric
ular and subcortical white matter changes are seen throughout the supratentorium in addition to small
 lacunar injuries of the left rosibel and right lentiform nucleus.  

 

Midline structures demonstrate normal morphology.  The craniocervical junction appears within normal 
limits.  Post contrast images demonstrate no abnormal enhancement. The dural venous sinuses appear pa
tent. There is scant mucosal thickening within the ethmoid sinuses. The remaining visualized sinuses 
are clear and the globes are intact. There is complete opacification of the left mastoid air cells an
d fluid seen within the left middle ear cavity. Findings are similar to the exam of 9/24/2017. Major 
intracranial flow voids are maintained.

 

There is a conventional 3 vessel branch pattern of the aortic arch. Minimal atheromatous plaquing is 
seen of the common carotid arteries without hemodynamically significant stenosis. No focal stenosis i
s seen of the cervical portion of the internal carotid arteries. No evidence of vascular dissection i
s seen. The vertebral arteries are patent and codominant. No focal aneurysmal outpouching.

 

IMPRESSION: 

1. No acute intracranial process. No territorial infarct or midline shift. No abnormal intracranial e
nhancement or evidence of intracranial mass.

2. Moderate to severe burden nonspecific white matter change, likely on the basis of chronic microang
iopathy and age-related volume loss.

3. 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

4. Chronic left otomastoiditis, similar to exam of 9/24/2017.

5. No evidence of hemodynamically significant stenosis, focal vascular occlusion, aneurysm or dissect
ion in the neck.

## 2018-05-24 RX ADMIN — PANTOPRAZOLE SODIUM SCH MG: 40 TABLET, DELAYED RELEASE ORAL at 09:07

## 2018-05-24 RX ADMIN — ATORVASTATIN CALCIUM SCH MG: 40 TABLET, FILM COATED ORAL at 09:07

## 2018-05-24 RX ADMIN — CYANOCOBALAMIN TAB 500 MCG SCH MCG: 500 TAB at 13:26

## 2018-05-24 RX ADMIN — ASPIRIN 81 MG CHEWABLE TABLET SCH MG: 81 TABLET CHEWABLE at 20:07

## 2018-05-24 RX ADMIN — LORATADINE SCH MG: 10 TABLET ORAL at 09:08

## 2018-05-24 RX ADMIN — ENOXAPARIN SODIUM SCH MG: 40 INJECTION SUBCUTANEOUS at 09:07

## 2018-05-24 RX ADMIN — ASPIRIN 81 MG CHEWABLE TABLET SCH MG: 81 TABLET CHEWABLE at 09:07

## 2018-05-24 NOTE — PN
PROGRESS NOTE



DATE OF SERVICE:

05/24/2018



PRESENTING COMPLAINT:

Dizziness, slurred speech.



INTERVAL HISTORY:

This patient presented with dizziness, slurred speech; no further symptoms.  I did talk

to Dr. EVELINA Mitchell yesterday.  She wishes to proceed with a MIRA, which is being arranged.

Otherwise patient is tolerating his diet.



REVIEW OF SYSTEMS:

Done for constitutional, cardiovascular, GI, pulmonary, neuro; relevant findings as

above.



CURRENT MEDICATIONS:

Reviewed. They include aspirin and Lipitor.



PHYSICAL EXAMINATION:

Temperature 97.7, pulse 78, respiration 20, blood pressure 134/82, pulse ox 94% on room

air.

GENERAL APPEARANCE:  Sitting at edge of the bed, comfortable.

EYES: Pupils equal. Conjunctivae normal.

HEENT: External appearance of nose and ears normal. Oral cavity normal.

NECK: JVD not raised.  Mass not palpable.

RESPIRATORY: Effort normal. Lungs are clear.

CARDIOVASCULAR: First and second sounds normal. No edema.

ABDOMEN: Soft, non-tender. Liver and spleen not palpable.

PSYCHIATRY: Alert and oriented x3. Mood and affect normal.

Right wrist in an Ace wrap.



INVESTIGATIONS:

Potassium 4.2. BUN and creatinine are normal.



ASSESSMENT:

1. Episode of slurred speech and unsteady gait; possible transient ischemic attack.

    Pending MIRA as ordered by Neurology.

2. Right hand sprain, currently in Ace wrap.

3. Chronic right foot drop.  Patient has a support in place.

4. Hypercholesteremia.



PLAN:

Care was discussed with the patient. MIRA is being scheduled for tomorrow morning. Care

was discussed with the patient.





MMODL / IJN: [id number] / Job#: [id number]

## 2018-05-24 NOTE — P.CRDCN
History of Present Illness


History of present illness: 





Mr. Xie is a pleasant 83-year-old  male past medical history 

significant for dyslipidemia, former tobacco use and hernia repair. We have 

been asked to see him in consultation for MIRA to rule out embolic origin for 

TIA. MRI/MRA of the brain performed were unremarkable. He initially presented 

with symptoms of slurred speech that lasted approximately an hour. He had CTA 

of brain and neck at [facility] that were unremarkable. Telemetry 

tracings have been unremarkable and he has been maintaining sinus mechanism. 





Echocardiogram and Doppler study performed on this admission revealed preserved 

left ventricular systolic function with ejection fraction 50-55%, severely 

dilated left atrium, mild aortic valve sclerosis with no stenosis, mild MR and 

mild TR.


Laboratory data reviewed, hemoglobin 14.5, platelets 213, sodium 139, potassium 

4.2, creatinine 0.7, LDL 79 HDL 50, triglycerides 81 and total cholesterol 153. 


Current medications include aspirin 81 mg twice a day, atorvastatin 40 mg daily 

and Zyrtec, Mobic and episode.





Review of Systems





At the time of my exam:


CONSTITUTIONAL: Denies fever. Denies chills.


EYES: Denies blurred vision. Denies vision changes. Denies eye pain.


EARS, NOSE, MOUTH & THROAT: Denies headache. Denies sore throat. Denies ear 

pain.


CARDIOVASCULAR: Denies chest pain. Denies shortness of breath. Denies 

orthopnea. Denies PND. Denies palpitations.


RESPIRATORY: Denies cough. 


GASTROINTESTINAL: Denies abdominal pain. Denies diarrhea. Denies constipation. 

Denies nausea. Denies vomiting.


MUSCULOSKELETAL: Denies myalgias.


INTEGUMENTARY: Denies pruitis. Denies rash.


NEUROLOGIC: Denies numbness. Denies tingling. Denies weakness.


PSYCHIATRIC: Denies anxiety. Denies depression.


ENDOCRINE: Denies fatigue. Denies weight change. Denies polydipsia. Denies 

polyurina.


GENITOURINARY: Denies burning, hematuria or urgency with micturation.


HEMATOLOGIC: Denies history of anemia. Denies bleeding. 








Past Medical History


Past Medical History: CVA/TIA, Hyperlipidemia


History of Any Multi-Drug Resistant Organisms: None Reported


Additional Past Surgical History / Comment(s): hernia repairx2  rt knee 

replacement


Past Psychological History: No Psychological Hx Reported


Smoking Status: Former smoker


Past Alcohol Use History: Occasional


Past Drug Use History: None Reported





Medications and Allergies


 Home Medications











 Medication  Instructions  Recorded  Confirmed  Type


 


Meloxicam [Mobic] 7.5 mg PO DAILY PRN 09/24/17 05/21/18 History


 


Omeprazole 20 mg PO DAILY PRN 09/24/17 05/21/18 History


 


Cetirizine HCl [Zyrtec] 10 mg PO DAILY 05/21/18 05/21/18 History


 


Cyanocobalamin [Vitamin B-12] 500 mcg PO DAILY 05/21/18 05/21/18 History


 


Aspirin [Adult Low Dose Aspirin EC] 81 mg PO BID #0 05/23/18 05/21/18 Rx


 


Atorvastatin Calcium [Lipitor] 40 mg PO DAILY #30 tablet 05/23/18  Rx











 Allergies











Allergy/AdvReac Type Severity Reaction Status Date / Time


 


No Known Allergies Allergy   Verified 05/21/18 19:58














Physical Exam


Vitals: 


 Vital Signs











  Temp Pulse Resp BP Pulse Ox


 


 05/24/18 07:25  97.7 F  78  20  134/82  94 L


 


 05/23/18 20:00  98.1 F  88  16  151/97  95


 


 05/23/18 15:09  97.9 F  84  16  130/79  96








 Intake and Output











 05/23/18 05/24/18 05/24/18





 22:59 06:59 14:59


 


Intake Total 300  


 


Balance 300  


 


Intake:   


 


  Oral 300  


 


Other:   


 


  # Voids  2 














Blood pressure 134/82 heart rate 78 afebrile maintaining oxygen saturation on 

room air


GENERAL: This is a 83-year-old  male in no apparent distress at the 

time of my examination.


HEENT: Head is atraumatic, normocephalic. Pupils are equal, round. Sclerae 

anicteric. Conjunctivae are clear. Mucous membranes of the mouth are moist. 

Neck is supple. There is no jugular venous distention. No carotid bruit is 

heard.


LUNGS: Clear to auscultation no wheezes, rales or rhonchi. No chest wall 

tenderness is noted on palpation or with deep breathing.


HEART: Regular rate and rhythm without murmurs, rubs or gallops. S1 and S2 

heard.


ABDOMEN: Soft, nontender. Bowel sounds are heard. No organomegaly noted.


EXTREMITIES: No evidence of peripheral edema and no calf tenderness noted.


VASCULAR: Radial and dorsalis pedis pulses palpated, no evidence of clubbing.  


NEUROLOGIC: Patient is awake, alert and oriented x3.


 








Results





 05/23/18 07:16


 Current Medications











Generic Name Dose Route Start Last Admin





  Trade Name Isiah  PRN Reason Stop Dose Admin


 


Aspirin  81 mg  05/24/18 09:00  05/24/18 09:07





  Aspirin  PO   81 mg





  BID ZENY   Administration


 


Atorvastatin Calcium  40 mg  05/24/18 09:00  05/24/18 09:07





  Lipitor  PO   40 mg





  DAILY ZENY   Administration


 


Cyanocobalamin  500 mcg  05/22/18 12:00  05/24/18 13:26





  Vitamin B-12  PO   500 mcg





  1200 ZENY   Administration


 


Enoxaparin Sodium  40 mg  05/22/18 09:00  05/24/18 09:07





  Lovenox  SQ   40 mg





  DAILY ZENY   Administration


 


Loratadine  10 mg  05/22/18 09:00  05/24/18 09:08





  Claritin  PO   10 mg





  DAILY ZENY   Administration


 


Meloxicam  7.5 mg  05/21/18 20:10  05/22/18 15:12





  Mobic  PO   7.5 mg





  DAILY PRN   Administration





  Pain   


 


Pantoprazole Sodium  40 mg  05/23/18 07:30  05/24/18 09:07





  Protonix  PO   40 mg





  AC-BRKFST ZENY   Administration


 


Tramadol HCl  50 mg  05/21/18 20:11  05/23/18 19:21





  Ultram  PO   50 mg





  QID PRN   Administration





  Pain   








 Intake and Output











 05/23/18 05/24/18 05/24/18





 22:59 06:59 14:59


 


Intake Total 300  


 


Balance 300  


 


Intake:   


 


  Oral 300  


 


Other:   


 


  # Voids  2 








 





 05/23/18 07:16 











Assessment and Plan


Assessment: 





ASSESSMENT


1.  TIA


2.  Dyslipidemia





PLAN


MIRA will be performed tomorrow with Dr. Beach as requested per neurology. 


This has been explained to the patient, his wife and granddaughter in detail 

and he is in agreement to proceed with above stated procedure. 


NPO after midnight tonight. 


Thank you kindly for this consultation. 





Nurse Practitioner note has been reviewed, I agree with a documented findings 

and plan of care.  Patient was seen and examined.

## 2018-05-24 NOTE — P.PN
Subjective


Progress Note Date: 05/24/18





The patient is an 83-year-old man who has had recent recurrent TIAs consisting 

of vertigo and expressive aphasia.  He has been stable and has no new 

complaints.  Been no recurrence of expressive aphasia or vertigo.  He has had 

an MRI of the brain which revealed moderate to severe nonspecific white matter 

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  There was also an old 

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  The patient is 

scheduled for a MIRA in a.m.  If this is negative the patient will stay on 

aspirin at a higher dose of 81 mg twice a day.  The patient denies any focal 

weakness numbness visual changes.  He has been able to walk and has had a 

steady gait.  A history of chronic right foot drop.





Objective





- Vital Signs


Vital signs: 


 Vital Signs











Temp  97.4 F L  05/24/18 14:25


 


Pulse  91   05/24/18 14:25


 


Resp  16   05/24/18 14:25


 


BP  151/83   05/24/18 14:25


 


Pulse Ox  94 L  05/24/18 14:25








 Intake & Output











 05/24/18 05/24/18 05/25/18





 06:59 18:59 06:59


 


Intake Total 300 237 


 


Balance 300 237 


 


Intake:   


 


  Oral 300 237 


 


Other:   


 


  # Voids 2 10 














- Constitutional


General appearance: Present: average body habitus





- Respiratory


Respiratory: bilateral: CTA





- Cardiovascular


Rhythm: regular





- Neurologic


Neurologic: Present: CNII-XII intact





- Musculoskeletal


Musculoskeletal: Present: strength equal bilaterally





- Psychiatric


Psychiatric: Present: A&O x's 3





- Labs


CBC & Chem 7: 


 05/23/18 07:16





Assessment and Plan


(1) TIA (transient ischemic attack)


Current Visit: Yes   Status: Acute   SNOMED Code(s): 909483505


   


Plan: 





The patient is an 83-year-old man with history of recurrent episodes of aphasia 

which was brief.  This was associated with some vertigo.  The patient has 

likely had recurrent TIA . He has been taking one baby aspirin daily.  He has 

been stable during his hospital stay.'s awaiting a MIRA which will be done in 

a.m.  If this is negative patient will be discharged on 2 baby aspirins daily

## 2018-05-25 VITALS — DIASTOLIC BLOOD PRESSURE: 79 MMHG | TEMPERATURE: 98.6 F | HEART RATE: 92 BPM | SYSTOLIC BLOOD PRESSURE: 129 MMHG

## 2018-05-25 VITALS — RESPIRATION RATE: 18 BRPM

## 2018-05-25 RX ADMIN — CYANOCOBALAMIN TAB 500 MCG SCH MCG: 500 TAB at 12:39

## 2018-05-25 RX ADMIN — BENZOCAINE ONE SPRAY: 200 SPRAY DENTAL; ORAL; PERIODONTAL at 10:44

## 2018-05-25 RX ADMIN — ASPIRIN 81 MG CHEWABLE TABLET SCH MG: 81 TABLET CHEWABLE at 12:39

## 2018-05-25 RX ADMIN — PANTOPRAZOLE SODIUM SCH MG: 40 TABLET, DELAYED RELEASE ORAL at 12:39

## 2018-05-25 RX ADMIN — BENZOCAINE ONE SPRAY: 200 SPRAY DENTAL; ORAL; PERIODONTAL at 10:47

## 2018-05-25 RX ADMIN — ENOXAPARIN SODIUM SCH MG: 40 INJECTION SUBCUTANEOUS at 12:40

## 2018-05-25 RX ADMIN — LORATADINE SCH MG: 10 TABLET ORAL at 12:39

## 2018-05-25 RX ADMIN — ATORVASTATIN CALCIUM SCH MG: 40 TABLET, FILM COATED ORAL at 12:39

## 2018-05-25 NOTE — DS
DISCHARGE SUMMARY



FINAL DIAGNOSES:

1. Acute transient ischemic attack with slurring of speech.

2. Negative transesophageal echocardiogram.

3. Right hand sprain.

4. Chronic right footdrop.

5. Hypercholesteremia.

6. Old lacunar infarct.



DISCHARGE DISPOSITION:

The patient will be discharged in stable condition with guarded prognosis.



HISTORY OF PRESENT ILLNESS:

This 83-year-old gentleman with a past medical history of multiple medical problems,

being followed by Dr. Terrazas in the outpatient setting, was admitted with slurring

of speech. TIA was considered and MRI and MRA were done.  Neurology Dr. Mitchell saw the

patient during hospitalization.  MRA showed no acute abnormality; old left paramidline

pontine lacunar injury and old right lentiform nucleus lacunar injury were noted.  The

patient also had a MIRA per Dr. Mitchell's recommendation which was also negative. No

evidence of cardiac source of emboli was noted.



On exam, vitals are stable.

CARDIOVASCULAR SYSTEM:  S1, S2 muffled.

ABDOMEN: Soft.

NERVOUS SYSTEM: No focal deficit.



DISCHARGE ADVICE AND MEDICATIONS:

1. Discharge diet is cardiac.

2. Activity limited until followup.

3. Follow up with Dr. Terrazas in 2-3 days.

4. Follow up with Dr. Mitchell as advised.

5. Ecotrin 81 mg p.o. b.i.d.

6. Lipitor 40 mg p.o. daily.

7. Zyrtec 10 mg p.o. daily.

8. Vitamin B12 500 mcg p.o. daily.

9. Meloxicam 7.5 mg p.o. daily.

10.Omeprazole 20 mg p.o. daily.

Once again, the patient will be discharged in stable condition with guarded prognosis.





MMODL / IJN: [id number] / Job#: [id number]

## 2018-05-25 NOTE — ECHOT
TRANSESOPHAGEAL ECHOCARDIOGRAM



DATE OF SERVICE:

05/25/2018



PERFORMING PHYSICIAN:

Jamal Beach MD.



PROCEDURE PERFORMED:

Transesophageal echocardiogram.



INDICATION:

This is a pleasant 83-year-old gentleman who was admitted to the hospital with an

episode of TIA/stroke and the MIRA is to rule out any cardiac source of embolization.



COMPLICATION:

None.



LEVEL OF SEDATION:

Moderate with sedation length of 10 minutes.



PROCEDURE DESCRIPTION:

After obtaining an informed consent, explaining the procedure, benefits, risks,

complications and alternatives, the patient was brought to the transesophageal

echocardiogram suite. A pulse oximetry and heart rate monitors were attached to the

patient prior to the procedure. The patient's throat was sprayed using lidocaine

locally. Following that, the patient was turned into left lateral position. A bite

guard was placed and the patient was then sedated with the above doses of Versed and

fentanyl in divided doses. Following that, the transesophageal echocardiogram probe was

advanced through the bite guard into the mid esophagus where 2-D echocardiogram images

as well as color Doppler images of various cardiac structures were obtained. We

evaluated the interatrial septum using 2-D echocardiogram, color Doppler, and contrast

study. The procedure was completed. There were no complications.



FINDINGS:

The left ventricular dimension and systolic function appeared to be within normal

limits with EF about 50%.  The right ventricle is of normal size and function.  The

left atrium appeared to be mildly dilated.  The left atrial appendage appeared to be

free from any thrombus.  The interatrial septum appeared to be intact.  The aortic

valve is mildly thickened without stenosis with mild insufficiency.  The mitral valve

seems to be also thickened with mild MR.  Normal tricuspid valve and pulmonic valve.



CONCLUSION:

1. No evidence of cardiac source of embolization.

2. Intact interatrial septum without any evidence of shunt.

3. Normal left atrial appendage without any thrombus.

4. Normal left ventricular dimension and systolic function.

5. Aortic sclerosis without stenosis with mild insufficiency.

6. Thickened mitral valve leaflets with mild mitral regurgitation.

7. Normal tricuspid valve and pulmonic valve.

8. Normal aortic root dimension.

9. No evidence of pericardial effusion.





MMODL / IJN: [id number] / Job#: [id number]